# Patient Record
Sex: FEMALE | Race: WHITE
[De-identification: names, ages, dates, MRNs, and addresses within clinical notes are randomized per-mention and may not be internally consistent; named-entity substitution may affect disease eponyms.]

---

## 2017-02-17 ENCOUNTER — HOSPITAL ENCOUNTER (EMERGENCY)
Dept: HOSPITAL 10 - FTE | Age: 22
LOS: 1 days | Discharge: HOME | End: 2017-02-18
Payer: COMMERCIAL

## 2017-02-17 VITALS
WEIGHT: 121.25 LBS | BODY MASS INDEX: 22.89 KG/M2 | BODY MASS INDEX: 22.89 KG/M2 | WEIGHT: 121.25 LBS | HEIGHT: 61 IN | HEIGHT: 61 IN

## 2017-02-17 DIAGNOSIS — Z3A.01: ICD-10-CM

## 2017-02-17 DIAGNOSIS — O20.9: Primary | ICD-10-CM

## 2017-02-17 DIAGNOSIS — O23.41: ICD-10-CM

## 2017-02-17 PROCEDURE — 76801 OB US < 14 WKS SINGLE FETUS: CPT

## 2017-02-17 PROCEDURE — 84702 CHORIONIC GONADOTROPIN TEST: CPT

## 2017-02-17 PROCEDURE — 81003 URINALYSIS AUTO W/O SCOPE: CPT

## 2017-02-17 PROCEDURE — 86901 BLOOD TYPING SEROLOGIC RH(D): CPT

## 2017-02-17 PROCEDURE — 81001 URINALYSIS AUTO W/SCOPE: CPT

## 2017-02-17 PROCEDURE — 86900 BLOOD TYPING SEROLOGIC ABO: CPT

## 2017-02-17 PROCEDURE — 85025 COMPLETE CBC W/AUTO DIFF WBC: CPT

## 2017-02-17 PROCEDURE — 80053 COMPREHEN METABOLIC PANEL: CPT

## 2017-02-18 VITALS
TEMPERATURE: 98.7 F | SYSTOLIC BLOOD PRESSURE: 14 MMHG | RESPIRATION RATE: 18 BRPM | HEART RATE: 75 BPM | DIASTOLIC BLOOD PRESSURE: 75 MMHG

## 2017-02-18 LAB
ADD UMIC: YES
ALBUMIN SERPL-MCNC: 4.5 G/DL (ref 3.3–4.9)
ALBUMIN/GLOB SERPL: 1.28 {RATIO}
ALP SERPL-CCNC: 88 IU/L (ref 42–121)
ALT SERPL-CCNC: 19 IU/L (ref 13–69)
ANION GAP SERPL CALC-SCNC: 17 MMOL/L (ref 8–16)
AST SERPL-CCNC: 26 IU/L (ref 15–46)
BACTERIA #/AREA URNS HPF: (no result) /[HPF]
BASOPHILS # BLD AUTO: 0.1 10^3/UL (ref 0–0.1)
BASOPHILS NFR BLD: 0.5 % (ref 0–2)
BILIRUB DIRECT SERPL-MCNC: 0 MG/DL (ref 0–0.2)
BILIRUB SERPL-MCNC: 0.1 MG/DL (ref 0.2–1.3)
BUN SERPL-MCNC: 7 MG/DL (ref 7–20)
CALCIUM SERPL-MCNC: 9.5 MG/DL (ref 8.4–10.2)
CHLORIDE SERPL-SCNC: 106 MMOL/L (ref 97–110)
CO2 SERPL-SCNC: 26 MMOL/L (ref 21–31)
COLOR UR: (no result)
CONDITION: 1
CREAT SERPL-MCNC: 0.56 MG/DL (ref 0.44–1)
EOSINOPHIL # BLD: 0.1 10^3/UL (ref 0–0.5)
EOSINOPHIL NFR BLD: 0.5 % (ref 0–7)
ERYTHROCYTE [DISTWIDTH] IN BLOOD BY AUTOMATED COUNT: 13.3 % (ref 11.5–14.5)
GLOBULIN SER-MCNC: 3.5 G/DL (ref 1.3–3.2)
GLUCOSE SERPL-MCNC: 99 MG/DL (ref 70–220)
GLUCOSE UR STRIP-MCNC: NEGATIVE %
HCT VFR BLD CALC: 39.1 % (ref 37–47)
HGB BLD-MCNC: 13.3 G/DL (ref 12–16)
KETONES UR STRIP.AUTO-MCNC: NEGATIVE MG/DL
LYMPHOCYTES # BLD AUTO: 4.1 10^3/UL (ref 0.8–2.9)
LYMPHOCYTES NFR BLD AUTO: 38 % (ref 18–55)
MCH RBC QN AUTO: 31.2 PG (ref 29–33)
MCHC RBC AUTO-ENTMCNC: 33.9 G/DL (ref 32–37)
MCV RBC AUTO: 92 FL (ref 72–104)
MONOCYTES # BLD: 0.8 10^3/UL (ref 0.3–0.9)
MONOCYTES NFR BLD: 7.8 % (ref 0–13)
NEUTROPHILS # BLD: 5.7 10^3/UL (ref 1.6–7.5)
NEUTROPHILS NFR BLD AUTO: 53.2 % (ref 30–74)
NITRITE UR QL STRIP.AUTO: POSITIVE
NRBC # BLD MANUAL: 0 10^3/UL (ref 0–0)
NRBC BLD QL: 0 /100WBC (ref 0–0)
PLATELET # BLD: 338 10^3/UL (ref 140–440)
PMV BLD AUTO: 8.6 FL (ref 7.4–10.4)
POTASSIUM SERPL-SCNC: 4 MMOL/L (ref 3.5–5.1)
PROT SERPL-MCNC: 8 G/DL (ref 6.1–8.1)
RBC # BLD AUTO: 4.25 10^6/UL (ref 4.2–5.4)
RBC # UR AUTO: (no result) /UL
RBC #/AREA URNS HPF: (no result) /HPF
SODIUM SERPL-SCNC: 145 MMOL/L (ref 135–144)
SQUAMOUS #/AREA URNS HPF: (no result) /[HPF]
URINE BILIRUBIN (DIP): NEGATIVE
URINE TOTAL PROTEIN (DIP): NEGATIVE
UROBILINOGEN UR STRIP-ACNC: (no result) (ref 0.1–1)
WBC # BLD AUTO: 10.7 10^3/UL (ref 4.8–10.8)
WBC # BLD: 10.7 10^3/UL (ref 4.8–10.8)
WBC # UR STRIP: NEGATIVE /UL

## 2017-02-18 NOTE — ERD
ER Documentation


Chief Complaint


Date/Time


DATE: 17 


TIME: 02:45


Chief Complaint


left flank pain





HPI


There is a 22-year-old female who presents the emergency department today 

complaining of left-sided flank pain.  Patient states that one week ago she 

found out she was pregnant and had a positive pregnancy test at a clinic in 

[place] as well.  States she was told she is 6 weeks pregnant.  Patient 

states that yesterday she started having some vaginal bleeding and had some 

clots passed.  States the bleeding stopped today.  Denies any fevers or chills, 

nausea or vomiting.





ROS


All systems reviewed and are negative except as per history of present illness.





Medications


Home Meds


Active Scripts


Acetaminophen* (Tylophen*) 500 Mg Capsule, 1 CAP PO Q6H Y for PAIN AND OR 

ELEVATED TEMP, #30 CAP


   Prov:JODY COLLIER PA-C         17


Cephalexin* (Keflex*) 500 Mg Capsule, 500 MG PO QID for 7 Days, CAP


   Prov:JODY COLLIER PA-C         17





Allergies


Allergies:  


Coded Allergies:  


     No Known Allergy (Unverified , 17)





PMhx/Soc


Medical and Surgical Hx:  pt denies Medical Hx, pt denies Surgical Hx


History of Surgery:  Yes (Right leg surgery)


Anesthesia Reaction:  No


Hx Neurological Disorder:  No


Hx Respiratory Disorders:  No


Hx Cardiac Disorders:  No


Hx Psychiatric Problems:  No


Hx Miscellaneous Medical Probl:  No


Hx Alcohol Use:  No


Hx Substance Use:  No


Hx Tobacco Use:  No


Smoking Status:  Never smoker





Physical Exam


Vitals





Vital Signs








  Date Time  Temp Pulse Resp B/P Pulse Ox O2 Delivery O2 Flow Rate FiO2


 


17 00:19 99.7 85 20 127/82 100   








Physical Exam


Const:    No acute distress


Head:   Atraumatic 


Eyes:    Normal Conjunctiva


ENT:    Normal External Ears, Nose and Mouth.


Neck:               Full range of motion..~ No meningismus.


Resp:    Clear to auscultation bilaterally


Cardio:    Regular rate and rhythm, no murmurs


Abd:    Soft, non tender, non distended. Normal bowel sounds


Skin:    No petechiae or rashes


Back:   Left-sided flank pain.  No midline tenderness.  No CVA tenderness


Ext:    No cyanosis, or edema


Neur:    Awake and alert


Psych:    Normal Mood and Affect


Result Diagram:  


17 0310                                                                   

             17 0310





Results 24 hrs





 Laboratory Tests








Test


  17


03:10 17


04:00


 


Alanine Aminotransferase


(ALT/SGPT) 19IU/L 


  


 


 


Albumin 4.5g/dl  


 


Albumin/Globulin Ratio 1.28  


 


Alkaline Phosphatase 88IU/L  


 


Anion Gap 17  


 


Aspartate Amino Transf


(AST/SGOT) 26IU/L 


  


 


 


Basophils # 0.110^3/ul  


 


Basophils % 0.5%  


 


Beta HCG, Quantitative 13.1mIU/ml  


 


Blood Urea Nitrogen 7mg/dl  


 


Calcium Level 9.5mg/dl  


 


Carbon Dioxide Level 26mmol/L  


 


Chloride Level 106mmol/L  


 


Creatinine 0.56mg/dl  


 


Direct Bilirubin 0.00mg/dl  


 


Eosinophils # 0.110^3/ul  


 


Eosinophils % 0.5%  


 


Globulin 3.50g/dl  


 


Glucose Level 99mg/dl  


 


Hematocrit 39.1%  


 


Hemoglobin 13.3g/dl  


 


Indirect Bilirubin 0.1mg/dl  


 


Lymphocytes # 4.110^3/ul  


 


Lymphocytes % 38.0%  


 


Mean Corpuscular Hemoglobin 31.2pg  


 


Mean Corpuscular Hemoglobin


Concent 33.9g/dl 


  


 


 


Mean Corpuscular Volume 92.0fl  


 


Mean Platelet Volume 8.6fl  


 


Monocytes # 0.810^3/ul  


 


Monocytes % 7.8%  


 


Neutrophils # 5.710^3/ul  


 


Neutrophils % 53.2%  


 


Nucleated Red Blood Cells # 0.010^3/ul  


 


Nucleated Red Blood Cells % 0.0/100WBC  


 


Platelet Count 12420^3/UL  


 


Potassium Level 4.0mmol/L  


 


Red Blood Count 4.2510^6/ul  


 


Red Cell Distribution Width 13.3%  


 


Sodium Level 145mmol/L  


 


Total Bilirubin 0.1mg/dl  


 


Total Protein 8.0g/dl  


 


White Blood Count 10.710^3/ul  


 


Urine Bacteria  MANY 


 


Urine Bilirubin  NEGATIVE 


 


Urine Clarity


  


  SLIGHTLY


CLOUDY


 


Urine Color  LT. YELLOW 


 


Urine Glucose  NEGATIVE% 


 


Urine Hemoglobin  3+ 


 


Urine Ketones  NEGATIVE 


 


Urine Leukocyte Esterase  NEGATIVE 


 


Urine Microscopic RBC  5-10/HPF 


 


Urine Microscopic WBC  5-10/HPF 


 


Urine Nitrite  POSITIVE 


 


Urine Specific Gravity  1.025 


 


Urine Squamous Epithelial


Cells 


  FEW 


 


 


Urine Total Protein  NEGATIVE 


 


Urine Urobilinogen  2.0 E.U./dL 


 


Urine pH  6.0 





 DIAGNOSTIC IMAGING REPORT





 Patient: SEBASTIÁN MONTERO   : 1995   Age: 22  Sex: F                    

    


 MR #:    [id number]   Acct #:   [id number]    DOS: 17 0233


 Ordering MD: JODY COLLIER PA-C   Location:  [facility]   Room/Bed:             

                               


 








PROCEDURE:   Obstetrical ultrasound, limited. 


 


CLINICAL INDICATION:    Vaginal bleeding. 


 


TECHNIQUE:    Multiple sonographic images of the pelvis were obtained utilizing 

a transabdominal technique.   The images were reviewed on a PACS workstation. 


 


COMPARISON:   None. 


 


FINDINGS:


The uterus is visualized and measures 6.0 x 3.7 x 5.1 cm. No abnormal uterine 

mass is identified.  The endometrial echo complex is homogeneous and measures 

6.6 mm. No intrauterine pregnancy is identified.


 


There is no evidence for free fluid.  The right ovary has a normal echotexture 

and measures 3.0 x 1.8 x 2.0 cm.  The left ovary has a normal echotexture and 

measures 2.8 x 1.1 x 1.5 cm.  There is normal flow to both ovaries.  No adnexal 

masses are identified. 


 


IMPRESSION:


Unremarkable pelvic ultrasound. 


No intrauterine or extrauterine pregnancy identified.  Clinical beta-hCG 

correlation and follow-up is recommended.


_____________________________________________ 


.Robb Muniz MD, MD           Date    Time 


Electronically viewed and signed by .Robb Muniz MD, MD on 2017 03:12 


 


D:  2017 03:12  T:  2017 03:12


.T/





CC: JODY COLLIER PA-C





Procedures/MDM


This is a 22-year-old  female who presents to the emergency department 

today for some vaginal bleeding that started yesterday.  Patient was also 

complaining of flank pain.  She states she had a positive pregnancy test at a 

clinic in [place].





I obtained a urine pregnancy test that was negative however patient was told 

that she was pregnant by her clinic and given the complaint of vaginal bleeding 

I did do a complete OB workup per





Laboratory work shows no elevated white blood cell count.  She is not anemic.  

Platelets are within normal limits.  Sodium is mildly elevated otherwise 

electro lites are within normal limits.  Glucose within normal limits.  

Electrolytes are within normal limits per


UA shows negative leukocyte esterase.  Positive nitrates.  5-10 white blood 

cells.  Patient will be treated with a prescription for Keflex for urinary 

tract infection.


Beta quant hCG is 13.1


Rh status O+


Ultrasound shows no intrauterine or extrauterine pregnancy identified.  

Unremarkable pelvic ultrasound.  There is normal flow to both ovaries.  There 

are no adnexal masses.  Clinical beta quant correlation and follow-up is 

recommended.





Patient has a very low beta quant and she has no IUP on ultrasound.  I have 

explained to the patient that given her vaginal bleeding she may have had a 

possible miscarriage failed pregnancy or early normal pregnancy.  Patient has 

no abdominal pain on physical exam of low suspicion for ectopic pregnancy, tubo-

ovarian abscess, ovarian torsion or any acute surgical abdomen..  I explained 

to the patient that she needs to get a repeat beta quant in 48 hours.  Patient 

understood





Patient declined any pain meds and nausea medications here in the emergency 

depart.  Patient will be given a prescription for Tylenol and Keflex for home.





At this time the patient is stable for discharge and outpatient management. 

Patient should follow up with their PCP in the next 1-2 days.  They may return 

to the emergency department sooner for any persistent or worsening of symptoms.

  Patient understood and agreed with the plan.





Departure


Diagnosis:  


 Primary Impression:  


 Vaginal bleeding in pregnant patient at less than 20 weeks gestation


 Additional Impression:  


 UTI (urinary tract infection)


 Urinary tract infection type:  site unspecified  Hematuria presence:  with 

hematuria  Qualified Code:  N39.0 - Urinary tract infection with hematuria, 

site unspecified


Condition:  JODY Felix PA-C 2017 02:48

## 2017-02-18 NOTE — RADRPT
PROCEDURE:   Obstetrical ultrasound, limited. 

 

CLINICAL INDICATION:    Vaginal bleeding. 

 

TECHNIQUE:    Multiple sonographic images of the pelvis were obtained utilizing a transabdominal nicole
hnique.   The images were reviewed on a PACS workstation. 

 

COMPARISON:   None. 

 

FINDINGS:

The uterus is visualized and measures 6.0 x 3.7 x 5.1 cm. No abnormal uterine mass is identified.  T
he endometrial echo complex is homogeneous and measures 6.6 mm. No intrauterine pregnancy is identif
ied.

 

There is no evidence for free fluid.  The right ovary has a normal echotexture and measures 3.0 x 1.
8 x 2.0 cm.  The left ovary has a normal echotexture and measures 2.8 x 1.1 x 1.5 cm.  There is norm
al flow to both ovaries.  No adnexal masses are identified. 

 

IMPRESSION:

Unremarkable pelvic ultrasound. 

No intrauterine or extrauterine pregnancy identified.  Clinical beta-hCG correlation and follow-up i
s recommended.

_____________________________________________ 

.Robb Muniz MD, MD           Date    Time 

Electronically viewed and signed by .Robb Muniz MD, MD on 02/18/2017 03:12 

 

D:  02/18/2017 03:12  T:  02/18/2017 03:12

.T/

## 2017-11-06 ENCOUNTER — HOSPITAL ENCOUNTER (EMERGENCY)
Dept: HOSPITAL 10 - FTE | Age: 22
Discharge: LEFT BEFORE DISCHARGE | End: 2017-11-06
Payer: COMMERCIAL

## 2017-11-06 DIAGNOSIS — Z53.21: Primary | ICD-10-CM

## 2017-11-06 PROCEDURE — 76805 OB US >/= 14 WKS SNGL FETUS: CPT

## 2017-11-07 NOTE — RADRPT
PROCEDURE:   US OB. 

 

CLINICAL INDICATION:   Left sided pelvic pain.  Positive pregnancy

 

TECHNIQUE:   Transabdominal and transvaginal views of the pelvis are available for review. 

 

COMPARISON:   No prior studies are available for comparison. 

 

FINDINGS:

 

Uterus:  No evidence of masses and normal in size.

 

Endometrial cavity:  Intrauterine gestational sac, yolk sac and fetal pole are present with the foll
owing information:

 

Crown-rump length:3.66 cm 

Fetal heart rate:166 bpm 

Gestational sac:4.67 cm  

Ultrasound estimated gestational age:10 weeks 4 days 

No evidence of subchorionic hemorrhage 

 

Right ovary/adnexa:  The ovary is not visualized.  There is no evidence of adnexal mass or free flui
d.

 

Left ovary/adnexa:  Ovarian size normal estimated at 3.4 x 1.9 x 1.8 cm. No ovarian or adnexal mass 
lesion is seen.

 

Cul-de-sac: There is no free fluid.  

 

RPTAT:HJJR 

 

IMPRESSION:

 

1.  Single live intrauterine pregnancy with an estimated gestational age of 10 weeks 4 days, the est
imated date of delivery 05/31/2018.   

2.  Unremarkable left ovary. The right ovary is not visualized.

_____________________________________________ 

Physician Louisa           Date    Time 

Electronically viewed and signed by Physician Louisa on 11/06/2017 22:58 

 

D:  11/06/2017 22:58  T:  11/06/2017 22:58

/

## 2018-03-26 ENCOUNTER — HOSPITAL ENCOUNTER (OUTPATIENT)
Age: 23
Discharge: HOME | End: 2018-03-26

## 2018-03-26 ENCOUNTER — HOSPITAL ENCOUNTER (OUTPATIENT)
Dept: HOSPITAL 91 - OBT | Age: 23
Discharge: HOME | End: 2018-03-26
Payer: COMMERCIAL

## 2018-03-26 DIAGNOSIS — Z3A.27: ICD-10-CM

## 2018-03-26 DIAGNOSIS — O26.892: Primary | ICD-10-CM

## 2018-03-26 DIAGNOSIS — M54.9: ICD-10-CM

## 2018-03-26 DIAGNOSIS — R10.30: ICD-10-CM

## 2018-03-26 LAB
FETAL FIBRONECTIN: NEGATIVE
RUPTURE FETAL MEMBRANES: NEGATIVE

## 2018-03-26 PROCEDURE — 96360 HYDRATION IV INFUSION INIT: CPT

## 2018-03-26 PROCEDURE — 76818 FETAL BIOPHYS PROFILE W/NST: CPT

## 2018-03-26 PROCEDURE — 36415 COLL VENOUS BLD VENIPUNCTURE: CPT

## 2018-03-26 PROCEDURE — 82731 ASSAY OF FETAL FIBRONECTIN: CPT

## 2018-03-26 PROCEDURE — 96361 HYDRATE IV INFUSION ADD-ON: CPT

## 2018-03-26 PROCEDURE — 84112 EVAL AMNIOTIC FLUID PROTEIN: CPT

## 2018-03-26 PROCEDURE — 76817 TRANSVAGINAL US OBSTETRIC: CPT

## 2018-03-26 PROCEDURE — 76815 OB US LIMITED FETUS(S): CPT

## 2018-03-26 PROCEDURE — 96372 THER/PROPH/DIAG INJ SC/IM: CPT

## 2018-03-26 RX ADMIN — TERBUTALINE SULFATE 1 MG: 1 INJECTION SUBCUTANEOUS at 17:17

## 2018-03-26 RX ADMIN — PYRIDOXINE HYDROCHLORIDE 1 MLS/HR: 100 INJECTION, SOLUTION INTRAMUSCULAR; INTRAVENOUS at 17:16

## 2018-03-26 RX ADMIN — BETAMETHASONE SODIUM PHOSPHATE AND BETAMETHASONE ACETATE 1 MG: 3; 3 INJECTION, SUSPENSION INTRA-ARTICULAR; INTRALESIONAL; INTRAMUSCULAR at 18:23

## 2018-03-27 ENCOUNTER — HOSPITAL ENCOUNTER (INPATIENT)
Age: 23
LOS: 2 days | Discharge: HOME | DRG: 778 | End: 2018-03-29

## 2018-03-27 ENCOUNTER — HOSPITAL ENCOUNTER (INPATIENT)
Dept: HOSPITAL 91 - OBT | Age: 23
LOS: 2 days | Discharge: HOME | DRG: 778 | End: 2018-03-29
Payer: COMMERCIAL

## 2018-03-27 DIAGNOSIS — O60.03: Primary | ICD-10-CM

## 2018-03-27 DIAGNOSIS — Z3A.31: ICD-10-CM

## 2018-03-27 LAB
ADD UMIC: YES
UR ASCORBIC ACID: NEGATIVE MG/DL
UR BACTERIA: (no result) /HPF
UR BILIRUBIN (DIP): NEGATIVE MG/DL
UR BLOOD (DIP): NEGATIVE MG/DL
UR CLARITY: (no result)
UR COLOR: YELLOW
UR GLUCOSE (DIP): NEGATIVE MG/DL
UR KETONES (DIP): (no result) MG/DL
UR LEUKOCYTE ESTERASE (DIP): (no result) LEU/UL
UR NITRITE (DIP): NEGATIVE MG/DL
UR PH (DIP): 6 (ref 5–9)
UR RBC: 2 /HPF (ref 0–5)
UR SPECIFIC GRAVITY (DIP): 1.02 (ref 1–1.03)
UR SQUAMOUS EPITHELIAL CELL: (no result) /HPF
UR TOTAL PROTEIN (DIP): (no result) MG/DL
UR UROBILINOGEN (DIP): NEGATIVE MG/DL
UR WBC: 4 /HPF (ref 0–5)

## 2018-03-27 PROCEDURE — 76817 TRANSVAGINAL US OBSTETRIC: CPT

## 2018-03-27 PROCEDURE — 36415 COLL VENOUS BLD VENIPUNCTURE: CPT

## 2018-03-27 PROCEDURE — 83735 ASSAY OF MAGNESIUM: CPT

## 2018-03-27 PROCEDURE — 76815 OB US LIMITED FETUS(S): CPT

## 2018-03-27 PROCEDURE — 85025 COMPLETE CBC W/AUTO DIFF WBC: CPT

## 2018-03-27 PROCEDURE — 81001 URINALYSIS AUTO W/SCOPE: CPT

## 2018-03-27 PROCEDURE — 87086 URINE CULTURE/COLONY COUNT: CPT

## 2018-03-27 RX ADMIN — BETAMETHASONE SODIUM PHOSPHATE AND BETAMETHASONE ACETATE 1 MG: 3; 3 INJECTION, SUSPENSION INTRA-ARTICULAR; INTRALESIONAL; INTRAMUSCULAR at 20:39

## 2018-03-27 RX ADMIN — PYRIDOXINE HYDROCHLORIDE 1 MLS/HR: 100 INJECTION, SOLUTION INTRAMUSCULAR; INTRAVENOUS at 23:00

## 2018-03-28 LAB
ADD MAN DIFF?: NO
BASOPHIL #: 0 10^3/UL (ref 0–0.1)
BASOPHILS %: 0.2 % (ref 0–2)
EOSINOPHILS #: 0 10^3/UL (ref 0–0.5)
EOSINOPHILS %: 0 % (ref 0–7)
HEMATOCRIT: 26.6 % (ref 37–47)
HEMOGLOBIN: 8.9 G/DL (ref 12–16)
LYMPHOCYTES #: 2.3 10^3/UL (ref 0.8–2.9)
LYMPHOCYTES %: 15.5 % (ref 15–51)
MAGNESIUM: 5.1 MG/DL (ref 1.7–2.5)
MAGNESIUM: 5.6 MG/DL (ref 1.7–2.5)
MAGNESIUM: 5.6 MG/DL (ref 1.7–2.5)
MEAN CORPUSCULAR HEMOGLOBIN: 30.8 PG (ref 29–33)
MEAN CORPUSCULAR HGB CONC: 33.5 G/DL (ref 32–37)
MEAN CORPUSCULAR VOLUME: 92 FL (ref 82–101)
MEAN PLATELET VOLUME: 10 FL (ref 7.4–10.4)
MONOCYTE #: 1.5 10^3/UL (ref 0.3–0.9)
MONOCYTES %: 10.2 % (ref 0–11)
NEUTROPHIL #: 10.7 10^3/UL (ref 1.6–7.5)
NEUTROPHILS %: 73.1 % (ref 39–77)
NUCLEATED RED BLOOD CELLS #: 0 10^3/UL (ref 0–0)
NUCLEATED RED BLOOD CELLS%: 0 /100WBC (ref 0–0)
PLATELET COUNT: 305 10^3/UL (ref 140–415)
RED BLOOD COUNT: 2.89 10^6/UL (ref 4.2–5.4)
RED CELL DISTRIBUTION WIDTH: 13.1 % (ref 11.5–14.5)
WHITE BLOOD COUNT: 14.7 10^3/UL (ref 4.8–10.8)

## 2018-03-28 RX ADMIN — Medication 1 TAB: at 10:37

## 2018-03-28 RX ADMIN — MAGNESIUM SULFATE IN WATER 1 MLS/HR: 40 INJECTION, SOLUTION INTRAVENOUS at 10:36

## 2018-03-28 RX ADMIN — PYRIDOXINE HYDROCHLORIDE 1 MLS/HR: 100 INJECTION, SOLUTION INTRAMUSCULAR; INTRAVENOUS at 00:20

## 2018-03-28 RX ADMIN — MAGNESIUM SULFATE HEPTAHYDRATE 1 MLS/HR: 40 INJECTION, SOLUTION INTRAVENOUS at 00:19

## 2018-03-28 RX ADMIN — PYRIDOXINE HYDROCHLORIDE 1 MLS/HR: 100 INJECTION, SOLUTION INTRAMUSCULAR; INTRAVENOUS at 19:29

## 2018-03-28 RX ADMIN — MAGNESIUM SULFATE IN WATER 1 MLS/HR: 40 INJECTION, SOLUTION INTRAVENOUS at 01:07

## 2018-03-28 RX ADMIN — MAGNESIUM SULFATE IN WATER 1 MLS/HR: 40 INJECTION, SOLUTION INTRAVENOUS at 22:33

## 2018-03-29 LAB
MAGNESIUM: 5 MG/DL (ref 1.7–2.5)
MAGNESIUM: 5 MG/DL (ref 1.7–2.5)
MAGNESIUM: 5.1 MG/DL (ref 1.7–2.5)

## 2018-03-29 RX ADMIN — Medication 1 TAB: at 09:04

## 2018-03-29 RX ADMIN — PYRIDOXINE HYDROCHLORIDE 1 MLS/HR: 100 INJECTION, SOLUTION INTRAMUSCULAR; INTRAVENOUS at 09:07

## 2018-03-29 RX ADMIN — MAGNESIUM SULFATE IN WATER 1 MLS/HR: 40 INJECTION, SOLUTION INTRAVENOUS at 09:06

## 2018-04-17 ENCOUNTER — HOSPITAL ENCOUNTER (OUTPATIENT)
Age: 23
Discharge: HOME | End: 2018-04-17

## 2018-04-17 ENCOUNTER — HOSPITAL ENCOUNTER (OUTPATIENT)
Dept: HOSPITAL 91 - OBT | Age: 23
Discharge: HOME | End: 2018-04-17
Payer: COMMERCIAL

## 2018-04-17 DIAGNOSIS — Z3A.34: ICD-10-CM

## 2018-04-17 PROCEDURE — 96361 HYDRATE IV INFUSION ADD-ON: CPT

## 2018-04-17 PROCEDURE — 96372 THER/PROPH/DIAG INJ SC/IM: CPT

## 2018-04-17 PROCEDURE — 96360 HYDRATION IV INFUSION INIT: CPT

## 2018-04-17 PROCEDURE — 36415 COLL VENOUS BLD VENIPUNCTURE: CPT

## 2018-04-17 RX ADMIN — PYRIDOXINE HYDROCHLORIDE 1 MLS/HR: 100 INJECTION, SOLUTION INTRAMUSCULAR; INTRAVENOUS at 15:16

## 2018-04-17 RX ADMIN — PYRIDOXINE HYDROCHLORIDE 1 MLS/HR: 100 INJECTION, SOLUTION INTRAMUSCULAR; INTRAVENOUS at 14:03

## 2018-04-17 RX ADMIN — TERBUTALINE SULFATE 1 MG: 1 INJECTION SUBCUTANEOUS at 14:03

## 2018-05-12 ENCOUNTER — HOSPITAL ENCOUNTER (INPATIENT)
Age: 23
LOS: 2 days | Discharge: HOME | End: 2018-05-14

## 2018-05-12 ENCOUNTER — HOSPITAL ENCOUNTER (INPATIENT)
Dept: HOSPITAL 91 - OBT | Age: 23
LOS: 2 days | Discharge: HOME | End: 2018-05-14
Payer: COMMERCIAL

## 2018-05-12 DIAGNOSIS — Z3A.37: ICD-10-CM

## 2018-05-12 LAB
ADD MAN DIFF?: NO
BASOPHIL #: 0.1 10^3/UL (ref 0–0.1)
BASOPHILS %: 0.4 % (ref 0–2)
EOSINOPHILS #: 0 10^3/UL (ref 0–0.5)
EOSINOPHILS %: 0.1 % (ref 0–7)
HEMATOCRIT: 32.3 % (ref 37–47)
HEMOGLOBIN: 10.2 G/DL (ref 12–16)
HEPATITIS B SURFACE ANTIGEN: NEGATIVE
INR: 0.88
LYMPHOCYTES #: 3.1 10^3/UL (ref 0.8–2.9)
LYMPHOCYTES %: 24.2 % (ref 15–51)
MEAN CORPUSCULAR HEMOGLOBIN: 27.4 PG (ref 29–33)
MEAN CORPUSCULAR HGB CONC: 31.6 G/DL (ref 32–37)
MEAN CORPUSCULAR VOLUME: 86.8 FL (ref 82–101)
MEAN PLATELET VOLUME: 10.5 FL (ref 7.4–10.4)
MONOCYTE #: 1.1 10^3/UL (ref 0.3–0.9)
MONOCYTES %: 8.7 % (ref 0–11)
NEUTROPHIL #: 8.5 10^3/UL (ref 1.6–7.5)
NEUTROPHILS %: 65.9 % (ref 39–77)
NUCLEATED RED BLOOD CELLS #: 0 10^3/UL (ref 0–0)
NUCLEATED RED BLOOD CELLS%: 0.2 /100WBC (ref 0–0)
PARTIAL THROMBOPLASTIN TIME: 29 SEC (ref 25–35)
PLATELET COUNT: 328 10^3/UL (ref 140–415)
PROTIME: 12 SEC (ref 11.9–14.9)
PT RATIO: 0.9
RAPID PLASMA REAGIN: NONREACTIVE
RED BLOOD COUNT: 3.72 10^6/UL (ref 4.2–5.4)
RED CELL DISTRIBUTION WIDTH: 13.6 % (ref 11.5–14.5)
WHITE BLOOD COUNT: 13 10^3/UL (ref 4.8–10.8)

## 2018-05-12 PROCEDURE — 3E033VJ INTRODUCTION OF OTHER HORMONE INTO PERIPHERAL VEIN, PERCUTANEOUS APPROACH: ICD-10-PCS

## 2018-05-12 PROCEDURE — 86850 RBC ANTIBODY SCREEN: CPT

## 2018-05-12 PROCEDURE — 85610 PROTHROMBIN TIME: CPT

## 2018-05-12 PROCEDURE — 85025 COMPLETE CBC W/AUTO DIFF WBC: CPT

## 2018-05-12 PROCEDURE — 86592 SYPHILIS TEST NON-TREP QUAL: CPT

## 2018-05-12 PROCEDURE — 86900 BLOOD TYPING SEROLOGIC ABO: CPT

## 2018-05-12 PROCEDURE — 85730 THROMBOPLASTIN TIME PARTIAL: CPT

## 2018-05-12 PROCEDURE — 0KQM0ZZ REPAIR PERINEUM MUSCLE, OPEN APPROACH: ICD-10-PCS

## 2018-05-12 PROCEDURE — 86901 BLOOD TYPING SEROLOGIC RH(D): CPT

## 2018-05-12 PROCEDURE — 87340 HEPATITIS B SURFACE AG IA: CPT

## 2018-05-12 RX ADMIN — WITCH HAZEL 1 PAD: 500 SOLUTION RECTAL; TOPICAL at 18:14

## 2018-05-12 RX ADMIN — MAGNESIUM HYDROXIDE 1 ML: 400 SUSPENSION ORAL at 20:48

## 2018-05-12 RX ADMIN — BUTORPHANOL TARTRATE 1 MG: 2 INJECTION, SOLUTION INTRAMUSCULAR; INTRAVENOUS at 12:51

## 2018-05-12 RX ADMIN — PYRIDOXINE HYDROCHLORIDE 1 MLS/HR: 100 INJECTION, SOLUTION INTRAMUSCULAR; INTRAVENOUS at 17:10

## 2018-05-12 RX ADMIN — Medication 1 MLS/HR: at 13:58

## 2018-05-12 RX ADMIN — AMPICILLIN 1 MLS/HR: 2 INJECTION, POWDER, FOR SOLUTION INTRAVENOUS at 13:01

## 2018-05-12 RX ADMIN — IBUPROFEN 1 MG: 600 TABLET ORAL at 23:39

## 2018-05-12 RX ADMIN — PYRIDOXINE HYDROCHLORIDE 1 MLS/HR: 100 INJECTION, SOLUTION INTRAMUSCULAR; INTRAVENOUS at 12:50

## 2018-05-12 RX ADMIN — Medication 1 MLS/HR: at 15:25

## 2018-05-12 RX ADMIN — DIBUCAINE 1 APPLIC: 0.28 OINTMENT TOPICAL at 18:15

## 2018-05-12 RX ADMIN — IBUPROFEN 1 MG: 600 TABLET ORAL at 18:07

## 2018-05-12 RX ADMIN — DOCUSATE SODIUM AND SENNOSIDES 1 TAB: 8.6; 5 TABLET, FILM COATED ORAL at 20:48

## 2018-05-12 RX ADMIN — Medication 1 SPRAY: at 18:14

## 2018-05-13 LAB
ADD MAN DIFF?: NO
BASOPHIL #: 0 10^3/UL (ref 0–0.1)
BASOPHILS %: 0.3 % (ref 0–2)
EOSINOPHILS #: 0 10^3/UL (ref 0–0.5)
EOSINOPHILS %: 0.1 % (ref 0–7)
HEMATOCRIT: 23.9 % (ref 37–47)
HEMOGLOBIN: 7.7 G/DL (ref 12–16)
LYMPHOCYTES #: 3.2 10^3/UL (ref 0.8–2.9)
LYMPHOCYTES %: 23.7 % (ref 15–51)
MEAN CORPUSCULAR HEMOGLOBIN: 28.2 PG (ref 29–33)
MEAN CORPUSCULAR HGB CONC: 32.2 G/DL (ref 32–37)
MEAN CORPUSCULAR VOLUME: 87.5 FL (ref 82–101)
MEAN PLATELET VOLUME: 10.4 FL (ref 7.4–10.4)
MONOCYTE #: 1.3 10^3/UL (ref 0.3–0.9)
MONOCYTES %: 9.7 % (ref 0–11)
NEUTROPHIL #: 8.9 10^3/UL (ref 1.6–7.5)
NEUTROPHILS %: 65.8 % (ref 39–77)
NUCLEATED RED BLOOD CELLS #: 0 10^3/UL (ref 0–0)
NUCLEATED RED BLOOD CELLS%: 0.1 /100WBC (ref 0–0)
PLATELET COUNT: 279 10^3/UL (ref 140–415)
RED BLOOD COUNT: 2.73 10^6/UL (ref 4.2–5.4)
RED CELL DISTRIBUTION WIDTH: 13.7 % (ref 11.5–14.5)
WHITE BLOOD COUNT: 13.5 10^3/UL (ref 4.8–10.8)

## 2018-05-13 RX ADMIN — IBUPROFEN 1 MG: 600 TABLET ORAL at 18:27

## 2018-05-13 RX ADMIN — MAGNESIUM HYDROXIDE 1 ML: 400 SUSPENSION ORAL at 21:11

## 2018-05-13 RX ADMIN — PYRIDOXINE HYDROCHLORIDE 1 MLS/HR: 100 INJECTION, SOLUTION INTRAMUSCULAR; INTRAVENOUS at 09:10

## 2018-05-13 RX ADMIN — DOCUSATE SODIUM AND SENNOSIDES 1 TAB: 8.6; 5 TABLET, FILM COATED ORAL at 08:53

## 2018-05-13 RX ADMIN — PYRIDOXINE HYDROCHLORIDE 1 MLS/HR: 100 INJECTION, SOLUTION INTRAMUSCULAR; INTRAVENOUS at 17:10

## 2018-05-13 RX ADMIN — MAGNESIUM HYDROXIDE 1 ML: 400 SUSPENSION ORAL at 08:53

## 2018-05-13 RX ADMIN — PYRIDOXINE HYDROCHLORIDE 1 MLS/HR: 100 INJECTION, SOLUTION INTRAMUSCULAR; INTRAVENOUS at 01:10

## 2018-05-13 RX ADMIN — IBUPROFEN 1 MG: 600 TABLET ORAL at 12:00

## 2018-05-13 RX ADMIN — IBUPROFEN 1 MG: 600 TABLET ORAL at 05:31

## 2018-05-13 RX ADMIN — DOCUSATE SODIUM AND SENNOSIDES 1 TAB: 8.6; 5 TABLET, FILM COATED ORAL at 21:11

## 2018-05-14 RX ADMIN — MEASLES, MUMPS, AND RUBELLA VIRUS VACCINE LIVE 1 ML: 1000; 12500; 1000 INJECTION, POWDER, LYOPHILIZED, FOR SUSPENSION SUBCUTANEOUS at 09:32

## 2018-05-14 RX ADMIN — IBUPROFEN 1 MG: 600 TABLET ORAL at 00:03

## 2018-05-14 RX ADMIN — VARICELLA VIRUS VACCINE LIVE 1 UNIT: 1350 INJECTION, POWDER, LYOPHILIZED, FOR SUSPENSION SUBCUTANEOUS at 09:32

## 2018-05-14 RX ADMIN — MAGNESIUM HYDROXIDE 1 ML: 400 SUSPENSION ORAL at 09:00

## 2018-05-14 RX ADMIN — PYRIDOXINE HYDROCHLORIDE 1 MLS/HR: 100 INJECTION, SOLUTION INTRAMUSCULAR; INTRAVENOUS at 01:10

## 2018-05-14 RX ADMIN — DOCUSATE SODIUM AND SENNOSIDES 1 TAB: 8.6; 5 TABLET, FILM COATED ORAL at 09:24

## 2018-05-14 RX ADMIN — IBUPROFEN 1 MG: 600 TABLET ORAL at 05:31

## 2018-05-14 RX ADMIN — IBUPROFEN 1 MG: 600 TABLET ORAL at 12:14

## 2018-05-14 RX ADMIN — CLOSTRIDIUM TETANI TOXOID ANTIGEN (FORMALDEHYDE INACTIVATED), CORYNEBACTERIUM DIPHTHERIAE TOXOID ANTIGEN (FORMALDEHYDE INACTIVATED), BORDETELLA PERTUSSIS TOXOID ANTIGEN (GLUTARALDEHYDE INACTIVATED), BORDETELLA PERTUSSIS FILAMENTOUS HEMAGGLUTININ ANTIGEN (FORMALDEHYDE INACTIVATED), BORDETELLA PERTUSSIS PERTACTIN ANTIGEN, AND BORDETELLA PERTUSSIS FIMBRIAE 2/3 ANTIGEN 1 ML: 5; 2; 2.5; 5; 3; 5 INJECTION, SUSPENSION INTRAMUSCULAR at 09:31
